# Patient Record
Sex: FEMALE | Race: WHITE
[De-identification: names, ages, dates, MRNs, and addresses within clinical notes are randomized per-mention and may not be internally consistent; named-entity substitution may affect disease eponyms.]

---

## 2019-01-03 ENCOUNTER — HOSPITAL ENCOUNTER (EMERGENCY)
Dept: HOSPITAL 1 - ED | Age: 54
Discharge: HOME | End: 2019-01-03
Payer: MEDICAID

## 2019-01-03 VITALS — HEIGHT: 68 IN | WEIGHT: 152 LBS | BODY MASS INDEX: 23.04 KG/M2

## 2019-01-03 VITALS — DIASTOLIC BLOOD PRESSURE: 84 MMHG | SYSTOLIC BLOOD PRESSURE: 145 MMHG

## 2019-01-03 DIAGNOSIS — S62.637A: Primary | ICD-10-CM

## 2019-01-03 DIAGNOSIS — S93.401A: ICD-10-CM
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
## 2019-01-03 DIAGNOSIS — Z98.890: ICD-10-CM
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
## 2019-01-03 DIAGNOSIS — Z88.6: ICD-10-CM
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
## 2019-01-03 DIAGNOSIS — Z98.51: ICD-10-CM

## 2019-07-26 ENCOUNTER — HOSPITAL ENCOUNTER (EMERGENCY)
Dept: HOSPITAL 1 - ED | Age: 54
Discharge: HOME | End: 2019-07-26
Payer: COMMERCIAL

## 2019-07-26 VITALS — SYSTOLIC BLOOD PRESSURE: 135 MMHG | DIASTOLIC BLOOD PRESSURE: 87 MMHG

## 2019-07-26 VITALS
HEIGHT: 69 IN | BODY MASS INDEX: 24.44 KG/M2 | BODY MASS INDEX: 24.44 KG/M2 | WEIGHT: 165 LBS | HEIGHT: 69 IN | WEIGHT: 165 LBS

## 2019-07-26 DIAGNOSIS — R07.89: Primary | ICD-10-CM

## 2019-07-26 DIAGNOSIS — Z98.51: ICD-10-CM

## 2019-07-26 DIAGNOSIS — Z98.890: ICD-10-CM

## 2019-07-26 DIAGNOSIS — Z88.6: ICD-10-CM

## 2019-07-26 LAB
ALBUMIN SERPL-MCNC: 3.7 G/DL (ref 3.4–5)
ALP SERPL-CCNC: 115 U/L (ref 46–116)
ALT SERPL-CCNC: 62 U/L (ref 14–59)
AST SERPL-CCNC: 44 U/L (ref 15–37)
BASOPHILS NFR BLD: 0.5 % (ref 0–2)
BILIRUB SERPL-MCNC: 0.3 MG/DL (ref 0.2–1)
BUN SERPL-MCNC: 15 MG/DL (ref 7–18)
CALCIUM SERPL-MCNC: 9.2 MG/DL (ref 8.5–10.1)
CHLORIDE SERPL-SCNC: 107 MMOL/L (ref 98–107)
CO2 SERPL-SCNC: 28.8 MMOL/L (ref 21–32)
CREAT SERPL-MCNC: 0.7 MG/DL (ref 0.6–1)
ERYTHROCYTE [DISTWIDTH] IN BLOOD BY AUTOMATED COUNT: 14.6 % (ref 11.5–14.5)
GFR SERPLBLD BASED ON 1.73 SQ M-ARVRAT: > 60 ML/MIN
GLUCOSE SERPL-MCNC: 93 MG/DL (ref 74–106)
PLATELET # BLD: 215 X10^3MCL (ref 130–400)
POTASSIUM SERPL-SCNC: 4.1 MMOL/L (ref 3.5–5.1)
PROT SERPL-MCNC: 7.5 G/DL (ref 6.4–8.2)
SODIUM SERPL-SCNC: 145 MMOL/L (ref 136–145)

## 2019-11-10 ENCOUNTER — HOSPITAL ENCOUNTER (EMERGENCY)
Dept: HOSPITAL 26 - MED | Age: 54
Discharge: ANOTHER HOSPITAL | End: 2019-11-10
Payer: MEDICAID

## 2019-11-10 VITALS — HEIGHT: 69 IN | WEIGHT: 160 LBS | BODY MASS INDEX: 23.7 KG/M2

## 2019-11-10 VITALS — DIASTOLIC BLOOD PRESSURE: 48 MMHG | SYSTOLIC BLOOD PRESSURE: 116 MMHG

## 2019-11-10 VITALS — DIASTOLIC BLOOD PRESSURE: 62 MMHG | SYSTOLIC BLOOD PRESSURE: 113 MMHG
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
## 2019-11-10 DIAGNOSIS — S82.851A: Primary | ICD-10-CM

## 2019-11-10 DIAGNOSIS — Z88.5: ICD-10-CM
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
## 2019-11-10 PROCEDURE — 99152 MOD SED SAME PHYS/QHP 5/>YRS: CPT

## 2019-11-10 PROCEDURE — 96376 TX/PRO/DX INJ SAME DRUG ADON: CPT

## 2019-11-10 PROCEDURE — 96374 THER/PROPH/DIAG INJ IV PUSH: CPT

## 2019-11-10 PROCEDURE — 99285 EMERGENCY DEPT VISIT HI MDM: CPT

## 2019-11-10 PROCEDURE — 27818 TREATMENT OF ANKLE FRACTURE: CPT

## 2019-11-10 PROCEDURE — 96372 THER/PROPH/DIAG INJ SC/IM: CPT

## 2019-11-10 PROCEDURE — 73610 X-RAY EXAM OF ANKLE: CPT

## 2019-11-10 PROCEDURE — G0500 MOD SEDAT ENDO SERVICE >5YRS: HCPCS

## 2019-11-10 NOTE — NUR
Called [facility] ER to give report to charge nurse NASIR Bailey. Nurse 
stated no need to give information at this time over the telephone, he would 
get it from the paper work when the patient arrives by ambulance.

## 2019-11-10 NOTE — NUR
START OF CONSCIOUS SEDATION PROCEDURE WITH MYSELF, DR. RENDON, AND RT PRESENT 
AT BEDSIDE. SEE CONSCIOUS SEDATION RECORD IN CHART.

## 2019-11-10 NOTE — NUR
PLACED A SHORT LEG POSTERIOR SPLINT ON PT'S LEFT LEG DIRECTLY AFTER DR SUERO'S 
CONSCIOUS SEDATED REDUCTION.

## 2019-11-10 NOTE — NUR
PATIENT TAKEN BY [facility] STAFF VIA GURNEY.

-------------------------------------------------------------------------------

Addendum: 11/10/19 at 1647 by ELIZABETH

-------------------------------------------------------------------------------

Patient to be transferred to [facility].  Is being transferred due to 
TRIMALLEOLAR FRACTURE.  Receiving facility has accepting physician and 
available space. ER physician has signed transfer form.  Patient or responsible 
party has agreed to transfer and signed form.  Patient belongings inventoried 
and will be sent with patient.  Copy of nursing notes, lab reports, EKG, 
Physicians Orders and X-rays to be sent with patient.  [facility] TRANSPORT AT 
BEDSIDE.

## 2019-11-10 NOTE — NUR
Patient states she twisted her ankle while walking last night around 10:00 pm. 
She states that she felt the ankle rotate from the right to the left and then 
experienced severe pain. She states she cannot put any weight on the left foot 
with standing and has severe pain when the foot is resting against the bed or 
chair. She took Tylenol this morning for the pain, and it has not helped with 
reducing the pain.